# Patient Record
Sex: MALE | Race: WHITE | NOT HISPANIC OR LATINO | Employment: OTHER | URBAN - METROPOLITAN AREA
[De-identification: names, ages, dates, MRNs, and addresses within clinical notes are randomized per-mention and may not be internally consistent; named-entity substitution may affect disease eponyms.]

---

## 2017-05-10 PROBLEM — I10 HTN (HYPERTENSION): Status: ACTIVE | Noted: 2017-05-10

## 2017-05-10 PROBLEM — N02.B9 BERGER DISEASE: Status: ACTIVE | Noted: 2017-05-10

## 2017-05-10 PROBLEM — I73.1 BUERGER DISEASE: Status: ACTIVE | Noted: 2017-05-10

## 2017-05-10 PROBLEM — R07.9 CHEST PAIN WITH MODERATE RISK FOR CARDIAC ETIOLOGY: Status: ACTIVE | Noted: 2017-05-10

## 2017-05-10 PROBLEM — Z86.73 HISTORY OF STROKE: Status: ACTIVE | Noted: 2017-05-10

## 2017-05-11 PROBLEM — R00.1 BRADYCARDIA: Status: ACTIVE | Noted: 2017-05-11

## 2017-05-11 PROBLEM — N17.9 AKI (ACUTE KIDNEY INJURY): Status: ACTIVE | Noted: 2017-05-11

## 2017-05-12 PROBLEM — R45.851 SUICIDAL IDEATION: Status: ACTIVE | Noted: 2017-05-12

## 2017-05-14 PROBLEM — I73.1: Status: ACTIVE | Noted: 2017-05-14

## 2017-06-28 PROBLEM — Z72.0 TOBACCO ABUSE: Status: ACTIVE | Noted: 2017-06-28

## 2017-06-28 PROBLEM — R07.89 CHEST WALL PAIN: Status: ACTIVE | Noted: 2017-05-10

## 2017-06-29 PROBLEM — R91.8 PULMONARY NODULES: Status: ACTIVE | Noted: 2017-06-29

## 2017-06-29 PROBLEM — K21.00 GASTROESOPHAGEAL REFLUX DISEASE WITH ESOPHAGITIS: Status: ACTIVE | Noted: 2017-06-29

## 2018-01-08 PROBLEM — I10 ACCELERATED HYPERTENSION: Status: ACTIVE | Noted: 2018-01-08

## 2018-01-08 PROBLEM — I15.8 OTHER SECONDARY HYPERTENSION: Status: ACTIVE | Noted: 2018-01-08

## 2018-01-09 PROBLEM — R79.89 TROPONIN LEVEL ELEVATED: Status: ACTIVE | Noted: 2018-01-09

## 2018-01-11 PROBLEM — I24.9 ACUTE ISCHEMIC HEART DISEASE: Status: ACTIVE | Noted: 2018-01-11

## 2018-01-11 PROBLEM — I20.0 UNSTABLE ANGINA PECTORIS: Status: ACTIVE | Noted: 2018-01-11

## 2018-01-11 PROBLEM — I25.110 CORONARY ARTERY DISEASE INVOLVING NATIVE CORONARY ARTERY OF NATIVE HEART WITH UNSTABLE ANGINA PECTORIS: Status: ACTIVE | Noted: 2018-01-11

## 2018-01-11 PROBLEM — R07.9 CHEST PAIN: Status: ACTIVE | Noted: 2018-01-11

## 2018-01-22 ENCOUNTER — TELEPHONE (OUTPATIENT)
Dept: VASCULAR SURGERY | Facility: CLINIC | Age: 58
End: 2018-01-22

## 2018-01-22 NOTE — TELEPHONE ENCOUNTER
----- Message from Jazmyn Velasquez sent at 1/22/2018 12:21 PM CST -----  [facility] / request a 2 week follow up appt (not able to schedule) call pt at [phone] (home)

## 2018-05-01 PROBLEM — I25.83 CORONARY ARTERY DISEASE DUE TO LIPID RICH PLAQUE: Status: ACTIVE | Noted: 2018-01-11

## 2018-05-01 PROBLEM — R07.89 CHEST WALL PAIN: Status: RESOLVED | Noted: 2017-05-10 | Resolved: 2018-05-01

## 2018-05-01 PROBLEM — R79.89 TROPONIN LEVEL ELEVATED: Status: RESOLVED | Noted: 2018-01-09 | Resolved: 2018-05-01

## 2018-05-01 PROBLEM — I25.10 CORONARY ARTERY DISEASE DUE TO LIPID RICH PLAQUE: Status: ACTIVE | Noted: 2018-01-11

## 2018-05-01 PROBLEM — I20.0 UNSTABLE ANGINA PECTORIS: Status: RESOLVED | Noted: 2018-01-11 | Resolved: 2018-05-01

## 2018-05-01 PROBLEM — R45.851 SUICIDAL IDEATION: Status: RESOLVED | Noted: 2017-05-12 | Resolved: 2018-05-01

## 2018-05-01 PROBLEM — R07.9 CHEST PAIN: Status: RESOLVED | Noted: 2018-01-11 | Resolved: 2018-05-01

## 2018-05-28 PROBLEM — Z91.199 NONCOMPLIANCE: Status: ACTIVE | Noted: 2018-05-28

## 2018-05-28 PROBLEM — R56.9 SEIZURE: Status: ACTIVE | Noted: 2018-05-28

## 2018-05-28 PROBLEM — N17.9 AKI (ACUTE KIDNEY INJURY): Status: ACTIVE | Noted: 2018-05-28

## 2018-05-28 PROBLEM — I10 ACCELERATED HYPERTENSION: Status: ACTIVE | Noted: 2018-05-28

## 2018-05-29 PROBLEM — Z91.148 NON COMPLIANCE W MEDICATION REGIMEN: Status: ACTIVE | Noted: 2018-05-28

## 2018-05-30 PROBLEM — E87.8 ELECTROLYTE ABNORMALITY: Status: ACTIVE | Noted: 2018-05-30

## 2019-01-31 PROBLEM — N18.30 CKD (CHRONIC KIDNEY DISEASE) STAGE 3, GFR 30-59 ML/MIN: Status: ACTIVE | Noted: 2019-01-31

## 2019-01-31 PROBLEM — I63.9 CVA (CEREBRAL VASCULAR ACCIDENT): Status: ACTIVE | Noted: 2019-01-31

## 2019-02-01 PROBLEM — R29.898 RIGHT ARM WEAKNESS: Status: ACTIVE | Noted: 2019-02-01

## 2019-02-01 PROBLEM — R44.3 HALLUCINATIONS: Status: ACTIVE | Noted: 2019-02-01

## 2021-08-04 PROBLEM — L03.116 CELLULITIS OF LEFT FOOT: Status: ACTIVE | Noted: 2021-01-01

## 2021-11-22 PROBLEM — D62 ACUTE BLOOD LOSS ANEMIA: Status: ACTIVE | Noted: 2021-01-01

## 2021-11-22 PROBLEM — K92.0 HEMATEMESIS: Status: ACTIVE | Noted: 2021-01-01

## 2021-11-22 PROBLEM — K92.1 BLOOD IN STOOL: Status: ACTIVE | Noted: 2021-01-01

## 2021-12-17 PROBLEM — R63.0 ANOREXIA: Status: ACTIVE | Noted: 2021-01-01

## 2021-12-17 PROBLEM — D72.829 LEUKOCYTOSIS: Status: ACTIVE | Noted: 2021-01-01

## 2021-12-19 PROBLEM — N30.00 ACUTE CYSTITIS: Status: ACTIVE | Noted: 2021-01-01

## 2021-12-19 PROBLEM — E43 SEVERE MALNUTRITION: Status: ACTIVE | Noted: 2021-01-01

## 2021-12-20 PROBLEM — G93.41 ENCEPHALOPATHY, METABOLIC: Status: ACTIVE | Noted: 2021-01-01

## 2021-12-20 PROBLEM — Z87.19 HISTORY OF GI BLEED: Status: ACTIVE | Noted: 2021-01-01

## 2021-12-20 PROBLEM — I70.209 ARTERIAL OCCLUSION, LOWER EXTREMITY: Status: ACTIVE | Noted: 2021-01-01

## 2021-12-22 PROBLEM — Z71.89 ACP (ADVANCE CARE PLANNING): Status: ACTIVE | Noted: 2021-01-01

## 2021-12-25 PROBLEM — Z75.8 DISCHARGE PLANNING ISSUES: Status: ACTIVE | Noted: 2021-01-01

## 2021-12-27 PROBLEM — A49.8 CLOSTRIDIUM DIFFICILE INFECTION: Status: ACTIVE | Noted: 2021-01-01

## 2021-12-27 PROBLEM — R19.7 DIARRHEA: Status: ACTIVE | Noted: 2021-01-01
